# Patient Record
Sex: MALE | Race: WHITE | NOT HISPANIC OR LATINO | Employment: UNEMPLOYED | URBAN - METROPOLITAN AREA
[De-identification: names, ages, dates, MRNs, and addresses within clinical notes are randomized per-mention and may not be internally consistent; named-entity substitution may affect disease eponyms.]

---

## 2018-04-17 ENCOUNTER — HOSPITAL ENCOUNTER (EMERGENCY)
Facility: MEDICAL CENTER | Age: 33
End: 2018-04-17
Attending: EMERGENCY MEDICINE
Payer: MEDICAID

## 2018-04-17 VITALS
BODY MASS INDEX: 34.12 KG/M2 | WEIGHT: 230.38 LBS | TEMPERATURE: 97.9 F | HEART RATE: 98 BPM | DIASTOLIC BLOOD PRESSURE: 68 MMHG | OXYGEN SATURATION: 96 % | RESPIRATION RATE: 16 BRPM | SYSTOLIC BLOOD PRESSURE: 122 MMHG | HEIGHT: 69 IN

## 2018-04-17 DIAGNOSIS — K59.03 DRUG-INDUCED CONSTIPATION: ICD-10-CM

## 2018-04-17 PROCEDURE — 96372 THER/PROPH/DIAG INJ SC/IM: CPT

## 2018-04-17 PROCEDURE — 99284 EMERGENCY DEPT VISIT MOD MDM: CPT

## 2018-04-17 PROCEDURE — A9270 NON-COVERED ITEM OR SERVICE: HCPCS | Performed by: EMERGENCY MEDICINE

## 2018-04-17 PROCEDURE — 700102 HCHG RX REV CODE 250 W/ 637 OVERRIDE(OP): Performed by: EMERGENCY MEDICINE

## 2018-04-17 PROCEDURE — 700111 HCHG RX REV CODE 636 W/ 250 OVERRIDE (IP): Performed by: EMERGENCY MEDICINE

## 2018-04-17 RX ORDER — POLYETHYLENE GLYCOL 3350 17 G/17G
17 POWDER, FOR SOLUTION ORAL DAILY
Qty: 3 EACH | Refills: 0 | Status: SHIPPED | OUTPATIENT
Start: 2018-04-17 | End: 2018-04-20

## 2018-04-17 RX ORDER — DOCUSATE SODIUM 100 MG/1
100 CAPSULE, LIQUID FILLED ORAL 2 TIMES DAILY
Qty: 60 CAP | Refills: 0 | Status: SHIPPED | OUTPATIENT
Start: 2018-04-17

## 2018-04-17 RX ADMIN — METHYLNALTREXONE BROMIDE 12 MG: 12 INJECTION, SOLUTION SUBCUTANEOUS at 23:30

## 2018-04-17 RX ADMIN — MAGESIUM CITRATE 296 ML: 1.75 LIQUID ORAL at 23:29

## 2018-04-17 ASSESSMENT — LIFESTYLE VARIABLES: DO YOU DRINK ALCOHOL: NO

## 2018-04-18 NOTE — ED TRIAGE NOTES
Chief Complaint   Patient presents with   • Constipation     Pt ambulatory to triage, pt states a week and a half ago had a catheter placed due to unable to void (catheter in place and draining to gravity), and now he hasn't had a BM for the past 2 weeks, states he took milk of magnesium last night and the night before and still no BM. Pt states he had the kaufman placed at [facility].

## 2018-04-18 NOTE — ED TRIAGE NOTES
Chief Complaint   Patient presents with   • Constipation     Pt ambulatory to triage, pt states a week and a half ago had a catheter placed due to unable to void (catheter in place and draining to gravity), and now he hasn't had a BM for the past 2 weeks, states he took milk of magnesium last night and the night before and still no BM. Pt states he had the kaufman placed at [facility].

## 2018-04-18 NOTE — DISCHARGE INSTRUCTIONS
Medications like Suboxone that attach to opiate receptors are common causes of constipation and urinary retention. Use the MiraLAX for the next 3 days. After that, every time you take your Suboxone, take a stool softener. Please follow-up with the doctor prescribing your Suboxone regarding the urinary retention and constipation symptoms.     Constipation, Adult  Constipation is when a person:  · Poops (has a bowel movement) fewer times in a week than normal.  · Has a hard time pooping.  · Has poop that is dry, hard, or bigger than normal.  Follow these instructions at home:  Eating and drinking  · Eat foods that have a lot of fiber, such as:  ¨ Fresh fruits and vegetables.  ¨ Whole grains.  ¨ Beans.  · Eat less of foods that are high in fat, low in fiber, or overly processed, such as:  ¨ French fries.  ¨ Hamburgers.  ¨ Cookies.  ¨ Candy.  ¨ Soda.  · Drink enough fluid to keep your pee (urine) clear or pale yellow.  General instructions  · Exercise regularly or as told by your doctor.  · Go to the restroom when you feel like you need to poop. Do not hold it in.  · Take over-the-counter and prescription medicines only as told by your doctor. These include any fiber supplements.  · Do pelvic floor retraining exercises, such as:  ¨ Doing deep breathing while relaxing your lower belly (abdomen).  ¨ Relaxing your pelvic floor while pooping.  · Watch your condition for any changes.  · Keep all follow-up visits as told by your doctor. This is important.  Contact a doctor if:  · You have pain that gets worse.  · You have a fever.  · You have not pooped for 4 days.  · You throw up (vomit).  · You are not hungry.  · You lose weight.  · You are bleeding from the anus.  · You have thin, pencil-like poop (stool).  Get help right away if:  · You have a fever, and your symptoms suddenly get worse.  · You leak poop or have blood in your poop.  · Your belly feels hard or bigger than normal (is bloated).  · You have very bad belly  pain.  · You feel dizzy or you faint.  This information is not intended to replace advice given to you by your health care provider. Make sure you discuss any questions you have with your health care provider.  Document Released: 06/05/2009 Document Revised: 07/07/2017 Document Reviewed: 06/07/2017  Elsevier Interactive Patient Education © 2017 [employer]

## 2018-04-18 NOTE — ED PROVIDER NOTES
"ED Provider Note    Scribed for Scottie Littlejohn M.D. by Scottie Littlejohn. 4/17/2018  10:42 PM    CHIEF COMPLAINT  Chief Complaint   Patient presents with   • Constipation       HPI  Scottie Kelley is a 32 y.o. male who presents to the Emergency Room for constipation, with little or no bowel movements for the past 1.5 weeks. He also had a Galan catheter placed about the same time because of urinary retention. He has not had fevers or chills or vomiting. He has some nausea. He has some abdominal distention. The catheter was placed at [facility], and this is also where he is receiving Suboxone therapy. He reports diffuse intense discomfort from the abdominal distention. He has not had any abdominal trauma or bloody stool. He does not have any back pain or radiculopathy. He has had no significant response to milk of magnesia or over-the-counter medication from the [employer] that his girlfriend brought him. He is not exactly sure what that medication was.    REVIEW OF SYSTEMS  See HPI for further details.    PAST MEDICAL HISTORY   has a past medical history of Anxiety and Urinary catheter in place.    SOCIAL HISTORY  Social History     Social History Main Topics   • Smoking status: Current Every Day Smoker     Packs/day: 0.50     Types: Cigarettes   • Smokeless tobacco: Never Used      Comment: 1/2 ppd   • Alcohol use No   • Drug use: No   • Sexual activity: Not on file       SURGICAL HISTORY   has a past surgical history that includes other orthopedic surgery (2003) and other (december 2008).    CURRENT MEDICATIONS  Home Medications     Reviewed by Freda Mitchell R.N. (Registered Nurse) on 04/17/18 at [facility]  Med List Status: Complete   Medication Last Dose Status   Buprenorphine HCl-Naloxone HCl (SUBOXONE SL)  Active   mupirocin (BACTROBAN) 2 % OINT  Active   NON SPECIFIED  Active                ALLERGIES  Allergies   Allergen Reactions   • Ibuprofen Unspecified     \"stomach starts to bleed and get " "abdominal pain\"   • Pcn [Penicillins] Hives       PHYSICAL EXAM  VITAL SIGNS: /69   Pulse (!) 107   Temp 36.6 °C (97.9 °F)   Resp 16   Ht 1.753 m (5' 9\")   Wt 104.5 kg (230 lb 6.1 oz)   SpO2 96%   BMI 34.02 kg/m²   Pulse ox interpretation: I interpret this pulse ox as normal.  Constitutional: Alert in no apparent distress.  HENT: Normocephalic, Atraumatic, Bilateral external ears normal. Nose normal.   Eyes: Conjunctiva normal, non-icteric.   Heart: Normal peripheral perfusion  Lungs: Unlabored respirations  Abdomen: Some evidence of distention, firm, mild diffuse nonlocalizing tenderness.  Skin: Warm, Dry, No erythema, No rash.   Neurologic: Alert, Grossly non-focal.   Psychiatric: Affect normal, Judgment normal, Mood normal, Appears appropriate and not intoxicated.     No orders to display       COURSE & MEDICAL DECISION MAKING  The patient's VS, Nurses notes reviewed. (See chart for details)    10:42 PM Patient seen and examined at bedside. He reliably reports that he's had little or nothing in the way of bowel movements for about 1.5 weeks, and also had a Galan catheter placed at that time. This is shortly after starting Suboxone therapy. He says he was not warned about the potential side effects of urinary retention and constipation. He has had inadequate response to over-the-counter medications. We treated with Relistor here, and magnesium citrate, and discharged with prescriptions for 3 days of MiraLAX for the next 3 days, and then Colace, to be started after that, with each dose of Suboxone that he takes.    The patient will return for new or worsening symptoms and is stable at the time of discharge.    The patient is referred to a primary physician for blood pressure management, diabetic screening, and for all other preventative health concerns.      DISPOSITION:  Patient will be discharged home in stable condition.    FOLLOW UP:  Your prescribing doctor.             OUTPATIENT " MEDICATIONS:  New Prescriptions    DOCUSATE SODIUM (COLACE) 100 MG CAP    Take 1 Cap by mouth 2 times a day.    POLYETHYLENE GLYCOL/LYTES (MIRALAX) PACK    Take 1 Packet by mouth every day for 3 days.         FINAL IMPRESSION  1. Drug-induced constipation

## 2018-04-18 NOTE — ED NOTES
Patient discharged with instructions for constipation with prescriptions x2 signs and symptoms explained to patient for reasons to return to emergency department, Patient is understanding and has no further questions.

## 2021-11-26 ENCOUNTER — APPOINTMENT (OUTPATIENT)
Dept: RADIOLOGY | Facility: MEDICAL CENTER | Age: 36
End: 2021-11-26
Attending: EMERGENCY MEDICINE
Payer: MEDICAID

## 2021-11-26 ENCOUNTER — HOSPITAL ENCOUNTER (EMERGENCY)
Facility: MEDICAL CENTER | Age: 36
End: 2021-11-26
Attending: EMERGENCY MEDICINE
Payer: MEDICAID

## 2021-11-26 VITALS
OXYGEN SATURATION: 95 % | HEIGHT: 69 IN | TEMPERATURE: 97.2 F | HEART RATE: 105 BPM | BODY MASS INDEX: 29.62 KG/M2 | RESPIRATION RATE: 20 BRPM | DIASTOLIC BLOOD PRESSURE: 100 MMHG | SYSTOLIC BLOOD PRESSURE: 151 MMHG | WEIGHT: 200 LBS

## 2021-11-26 DIAGNOSIS — S41.012A LACERATION OF LEFT SHOULDER, INITIAL ENCOUNTER: ICD-10-CM

## 2021-11-26 DIAGNOSIS — S42.192A CLOSED FRACTURE OF OTHER PART OF LEFT SCAPULA, INITIAL ENCOUNTER: ICD-10-CM

## 2021-11-26 DIAGNOSIS — S41.012A STAB WOUND OF LEFT SHOULDER, INITIAL ENCOUNTER: ICD-10-CM

## 2021-11-26 PROBLEM — S21.112A STAB WOUND OF LEFT CHEST,: Status: ACTIVE | Noted: 2021-11-26

## 2021-11-26 PROBLEM — S42.115B: Status: ACTIVE | Noted: 2021-11-26

## 2021-11-26 LAB
ABO GROUP BLD: NORMAL
ALBUMIN SERPL BCP-MCNC: 4.2 G/DL (ref 3.2–4.9)
ALBUMIN/GLOB SERPL: 1.3 G/DL
ALP SERPL-CCNC: 98 U/L (ref 30–99)
ALT SERPL-CCNC: 29 U/L (ref 2–50)
ANION GAP SERPL CALC-SCNC: 11 MMOL/L (ref 7–16)
APTT PPP: 29.5 SEC (ref 24.7–36)
AST SERPL-CCNC: 25 U/L (ref 12–45)
BILIRUB SERPL-MCNC: 0.2 MG/DL (ref 0.1–1.5)
BLD GP AB SCN SERPL QL: NORMAL
BUN SERPL-MCNC: 13 MG/DL (ref 8–22)
CALCIUM SERPL-MCNC: 9.3 MG/DL (ref 8.5–10.5)
CHLORIDE SERPL-SCNC: 106 MMOL/L (ref 96–112)
CO2 SERPL-SCNC: 28 MMOL/L (ref 20–33)
CREAT SERPL-MCNC: 1.22 MG/DL (ref 0.5–1.4)
ERYTHROCYTE [DISTWIDTH] IN BLOOD BY AUTOMATED COUNT: 44.7 FL (ref 35.9–50)
ETHANOL BLD-MCNC: <10.1 MG/DL (ref 0–10)
GLOBULIN SER CALC-MCNC: 3.2 G/DL (ref 1.9–3.5)
GLUCOSE SERPL-MCNC: 87 MG/DL (ref 65–99)
HCT VFR BLD AUTO: 48.2 % (ref 42–52)
HGB BLD-MCNC: 15.2 G/DL (ref 14–18)
INR PPP: 0.93 (ref 0.87–1.13)
MCH RBC QN AUTO: 27.9 PG (ref 27–33)
MCHC RBC AUTO-ENTMCNC: 31.5 G/DL (ref 33.7–35.3)
MCV RBC AUTO: 88.4 FL (ref 81.4–97.8)
PLATELET # BLD AUTO: 362 K/UL (ref 164–446)
PMV BLD AUTO: 9.9 FL (ref 9–12.9)
POTASSIUM SERPL-SCNC: 3.7 MMOL/L (ref 3.6–5.5)
PROT SERPL-MCNC: 7.4 G/DL (ref 6–8.2)
PROTHROMBIN TIME: 12.2 SEC (ref 12–14.6)
RBC # BLD AUTO: 5.45 M/UL (ref 4.7–6.1)
RH BLD: NORMAL
SODIUM SERPL-SCNC: 145 MMOL/L (ref 135–145)
WBC # BLD AUTO: 12.2 K/UL (ref 4.8–10.8)

## 2021-11-26 PROCEDURE — 86900 BLOOD TYPING SEROLOGIC ABO: CPT

## 2021-11-26 PROCEDURE — 304217 HCHG IRRIGATION SYSTEM

## 2021-11-26 PROCEDURE — 80053 COMPREHEN METABOLIC PANEL: CPT

## 2021-11-26 PROCEDURE — 305308 HCHG STAPLER,SKIN,DISP.

## 2021-11-26 PROCEDURE — 99284 EMERGENCY DEPT VISIT MOD MDM: CPT

## 2021-11-26 PROCEDURE — 700117 HCHG RX CONTRAST REV CODE 255: Performed by: EMERGENCY MEDICINE

## 2021-11-26 PROCEDURE — 82077 ASSAY SPEC XCP UR&BREATH IA: CPT

## 2021-11-26 PROCEDURE — 304999 HCHG REPAIR-SIMPLE/INTERMED LEVEL 1

## 2021-11-26 PROCEDURE — 90471 IMMUNIZATION ADMIN: CPT

## 2021-11-26 PROCEDURE — A9270 NON-COVERED ITEM OR SERVICE: HCPCS | Performed by: EMERGENCY MEDICINE

## 2021-11-26 PROCEDURE — 700111 HCHG RX REV CODE 636 W/ 250 OVERRIDE (IP): Performed by: EMERGENCY MEDICINE

## 2021-11-26 PROCEDURE — 96365 THER/PROPH/DIAG IV INF INIT: CPT | Mod: XU

## 2021-11-26 PROCEDURE — 700101 HCHG RX REV CODE 250: Performed by: EMERGENCY MEDICINE

## 2021-11-26 PROCEDURE — 99291 CRITICAL CARE FIRST HOUR: CPT | Performed by: SURGERY

## 2021-11-26 PROCEDURE — 71045 X-RAY EXAM CHEST 1 VIEW: CPT

## 2021-11-26 PROCEDURE — 85730 THROMBOPLASTIN TIME PARTIAL: CPT

## 2021-11-26 PROCEDURE — 96375 TX/PRO/DX INJ NEW DRUG ADDON: CPT | Mod: XU

## 2021-11-26 PROCEDURE — 85027 COMPLETE CBC AUTOMATED: CPT

## 2021-11-26 PROCEDURE — 86901 BLOOD TYPING SEROLOGIC RH(D): CPT

## 2021-11-26 PROCEDURE — 307744 HCHG RED TRAUMA TEAM SERVICES

## 2021-11-26 PROCEDURE — 700101 HCHG RX REV CODE 250

## 2021-11-26 PROCEDURE — 85610 PROTHROMBIN TIME: CPT

## 2021-11-26 PROCEDURE — 86850 RBC ANTIBODY SCREEN: CPT

## 2021-11-26 PROCEDURE — 71260 CT THORAX DX C+: CPT

## 2021-11-26 PROCEDURE — 700102 HCHG RX REV CODE 250 W/ 637 OVERRIDE(OP): Performed by: EMERGENCY MEDICINE

## 2021-11-26 PROCEDURE — 90715 TDAP VACCINE 7 YRS/> IM: CPT | Performed by: EMERGENCY MEDICINE

## 2021-11-26 RX ORDER — CEPHALEXIN 500 MG/1
500 CAPSULE ORAL 4 TIMES DAILY
Qty: 28 CAPSULE | Refills: 0 | Status: SHIPPED | OUTPATIENT
Start: 2021-11-26 | End: 2021-12-03

## 2021-11-26 RX ORDER — HYDROCODONE BITARTRATE AND ACETAMINOPHEN 5; 325 MG/1; MG/1
1-2 TABLET ORAL EVERY 4 HOURS PRN
Qty: 10 TABLET | Refills: 0 | Status: SHIPPED | OUTPATIENT
Start: 2021-11-26 | End: 2021-11-29

## 2021-11-26 RX ORDER — LIDOCAINE HYDROCHLORIDE AND EPINEPHRINE 10; 10 MG/ML; UG/ML
INJECTION, SOLUTION INFILTRATION; PERINEURAL
Status: COMPLETED
Start: 2021-11-26 | End: 2021-11-26

## 2021-11-26 RX ORDER — HYDROCODONE BITARTRATE AND ACETAMINOPHEN 5; 325 MG/1; MG/1
1 TABLET ORAL ONCE
Status: COMPLETED | OUTPATIENT
Start: 2021-11-26 | End: 2021-11-26

## 2021-11-26 RX ORDER — IBUPROFEN 600 MG/1
600 TABLET ORAL ONCE
Status: COMPLETED | OUTPATIENT
Start: 2021-11-26 | End: 2021-11-26

## 2021-11-26 RX ORDER — CEFAZOLIN SODIUM 2 G/100ML
INJECTION, SOLUTION INTRAVENOUS
Status: COMPLETED | OUTPATIENT
Start: 2021-11-26 | End: 2021-11-26

## 2021-11-26 RX ORDER — LIDOCAINE HYDROCHLORIDE AND EPINEPHRINE 10; 10 MG/ML; UG/ML
20 INJECTION, SOLUTION INFILTRATION; PERINEURAL ONCE
Status: COMPLETED | OUTPATIENT
Start: 2021-11-26 | End: 2021-11-26

## 2021-11-26 RX ORDER — IBUPROFEN 600 MG/1
600 TABLET ORAL EVERY 6 HOURS PRN
Qty: 20 TABLET | Refills: 0 | Status: SHIPPED | OUTPATIENT
Start: 2021-11-26

## 2021-11-26 RX ORDER — LIDOCAINE HYDROCHLORIDE AND EPINEPHRINE BITARTRATE 20; .01 MG/ML; MG/ML
10 INJECTION, SOLUTION SUBCUTANEOUS ONCE
Status: DISCONTINUED | OUTPATIENT
Start: 2021-11-26 | End: 2021-11-26

## 2021-11-26 RX ADMIN — CEFAZOLIN SODIUM 2 G: 2 INJECTION, SOLUTION INTRAVENOUS at 04:30

## 2021-11-26 RX ADMIN — IOHEXOL 80 ML: 350 INJECTION, SOLUTION INTRAVENOUS at 04:27

## 2021-11-26 RX ADMIN — CLOSTRIDIUM TETANI TOXOID ANTIGEN (FORMALDEHYDE INACTIVATED), CORYNEBACTERIUM DIPHTHERIAE TOXOID ANTIGEN (FORMALDEHYDE INACTIVATED), BORDETELLA PERTUSSIS TOXOID ANTIGEN (GLUTARALDEHYDE INACTIVATED), BORDETELLA PERTUSSIS FILAMENTOUS HEMAGGLUTININ ANTIGEN (FORMALDEHYDE INACTIVATED), BORDETELLA PERTUSSIS PERTACTIN ANTIGEN, AND BORDETELLA PERTUSSIS FIMBRIAE 2/3 ANTIGEN 0.5 ML: 5; 2; 2.5; 5; 3; 5 INJECTION, SUSPENSION INTRAMUSCULAR at 04:17

## 2021-11-26 RX ADMIN — LIDOCAINE HYDROCHLORIDE,EPINEPHRINE BITARTRATE 20 ML: 10; .01 INJECTION, SOLUTION INFILTRATION; PERINEURAL at 05:25

## 2021-11-26 RX ADMIN — LIDOCAINE HYDROCHLORIDE,EPINEPHRINE BITARTRATE: 10; .01 INJECTION, SOLUTION INFILTRATION; PERINEURAL at 05:15

## 2021-11-26 RX ADMIN — HYDROCODONE BITARTRATE AND ACETAMINOPHEN 1 TABLET: 5; 325 TABLET ORAL at 05:23

## 2021-11-26 RX ADMIN — IBUPROFEN 600 MG: 600 TABLET, FILM COATED ORAL at 05:23

## 2021-11-26 RX ADMIN — FENTANYL CITRATE 50 MCG: 50 INJECTION, SOLUTION INTRAMUSCULAR; INTRAVENOUS at 04:13

## 2021-11-26 ASSESSMENT — LIFESTYLE VARIABLES
DOES PATIENT WANT TO STOP DRINKING: NO
DO YOU DRINK ALCOHOL: NO
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
## 2021-11-26 NOTE — DISCHARGE INSTRUCTIONS
Follow-up with orthopedics next week for reevaluation and definitive treatment if indicated.  Call Dr. Dowling's office on Monday morning, references emergency department visit and schedule an appointment for follow-up.  Staple removal required in 10 days.  This can be done at orthopedic office, otherwise return to the emergency department.    Motrin 600 mg every 6 hours as needed for discomfort.  Norco every 4-6 hours as needed for breakthrough pain.  Keflex 4 times daily for 7 days.    Nonweightbearing left upper extremity.  Use sling for immobility, comfort and support.    Keep wound clean, dry and intact.  Cleanse gently with warm water, soap, pat dry twice daily.  Avoid soaking wound in water.  Apply antibiotic ointment twice daily.    Return the emergency department for increased pain, swelling, redness, paresthesias, fever, chest pain, shortness of breath or other new concerns.

## 2021-11-26 NOTE — ASSESSMENT & PLAN NOTE
CT shows:  Subcutaneous gas in the left subscapular and posterior deltoid muscles with a nondisplaced left scapular wing fracture.  Non-operative management.   Sling for comfort   Follow up in office - 1 week  Weight bearing status - Weightbearing as tolerated WES Griffin MD. Orthopedic Surgeon. [facility].
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
## 2021-11-26 NOTE — ED NOTES
"Pt discharged In custody of [employer], pt ambulatory with steady gait. AOx4. IV discontinued and gauze placed, pt in possession of belongings. Pt provided discharge education and information pertaining to medications and follow up appointments. Pt received copy of discharge instructions and verbalized understanding.     BP (!) 151/100   Pulse 105   Temp 36.2 °C (97.2 °F) (Temporal)   Resp 20   Ht 1.753 m (5' 9\")   Wt 90.7 kg (200 lb)   SpO2 95%   BMI 29.53 kg/m²   "
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
## 2022-10-01 ENCOUNTER — HOSPITAL ENCOUNTER (EMERGENCY)
Facility: MEDICAL CENTER | Age: 37
End: 2022-10-02
Attending: EMERGENCY MEDICINE
Payer: MEDICAID

## 2022-10-01 DIAGNOSIS — F15.10 AMPHETAMINE ABUSE (HCC): ICD-10-CM

## 2022-10-01 DIAGNOSIS — F11.23 OPIOID DEPENDENCE WITH WITHDRAWAL (HCC): ICD-10-CM

## 2022-10-01 PROCEDURE — 99284 EMERGENCY DEPT VISIT MOD MDM: CPT

## 2022-10-01 PROCEDURE — 700111 HCHG RX REV CODE 636 W/ 250 OVERRIDE (IP): Performed by: EMERGENCY MEDICINE

## 2022-10-01 RX ORDER — NALOXONE HYDROCHLORIDE 4 MG/.1ML
SPRAY NASAL
Qty: 2 EACH | Refills: 0 | Status: SHIPPED | OUTPATIENT
Start: 2022-10-01 | End: 2022-10-12

## 2022-10-01 RX ORDER — BUPRENORPHINE HYDROCHLORIDE AND NALOXONE HYDROCHLORIDE DIHYDRATE 8; 2 MG/1; MG/1
2 TABLET SUBLINGUAL DAILY
Qty: 6 TABLET | Refills: 0 | Status: SHIPPED | OUTPATIENT
Start: 2022-10-01 | End: 2022-10-05

## 2022-10-01 RX ORDER — BUPRENORPHINE HYDROCHLORIDE AND NALOXONE HYDROCHLORIDE DIHYDRATE 8; 2 MG/1; MG/1
1 TABLET SUBLINGUAL ONCE
Status: COMPLETED | OUTPATIENT
Start: 2022-10-01 | End: 2022-10-01

## 2022-10-01 RX ADMIN — BUPRENORPHINE HYDROCHLORIDE AND NALOXONE HYDROCHLORIDE DIHYDRATE 1 TABLET: 8; 2 TABLET SUBLINGUAL at 23:59

## 2022-10-01 ASSESSMENT — FIBROSIS 4 INDEX: FIB4 SCORE: 0.45

## 2022-10-02 ENCOUNTER — PHARMACY VISIT (OUTPATIENT)
Dept: PHARMACY | Facility: MEDICAL CENTER | Age: 37
End: 2022-10-02
Payer: COMMERCIAL

## 2022-10-02 VITALS
DIASTOLIC BLOOD PRESSURE: 87 MMHG | SYSTOLIC BLOOD PRESSURE: 139 MMHG | TEMPERATURE: 97 F | OXYGEN SATURATION: 95 % | HEART RATE: 105 BPM | RESPIRATION RATE: 16 BRPM | WEIGHT: 192.68 LBS | HEIGHT: 69 IN | BODY MASS INDEX: 28.54 KG/M2

## 2022-10-02 PROCEDURE — RXMED WILLOW AMBULATORY MEDICATION CHARGE: Performed by: EMERGENCY MEDICINE

## 2022-10-02 NOTE — ED PROVIDER NOTES
"ED Provider Note    CHIEF COMPLAINT  Chief Complaint   Patient presents with    Detox     PT reports use of fentanyl PT asking for assistance with  detox. PT reports last use yesterday.  PT reports use of Meth to deal with s/s of detox today around 12pm.       HPI  Scottie Kelley is a 37 y.o. male who presents to the emergency department chief complaint of withdrawal.  The patient has been using fentanyl last used earlier today and then used methamphetamines to help with withdrawal-like symptoms.  He is here to get help with his withdrawals.  He states he feels pretty antsy but otherwise no nausea no vomiting fevers no chills.  He feels a little depressed but not suicidal.  He is willing to take buprenorphine and is wishing to get help with detox.    REVIEW OF SYSTEMS  Positives as above. Pertinent negatives include suicidal or homicidal ideation nausea vomiting fevers chills  All other review of systems are negative    PAST MEDICAL HISTORY   has a past medical history of Anxiety and Urinary catheter in place.    SOCIAL HISTORY  Social History     Tobacco Use    Smoking status: Every Day     Packs/day: 0.50     Types: Cigarettes    Smokeless tobacco: Never    Tobacco comments:     1/2 ppd   Substance and Sexual Activity    Alcohol use: No    Drug use: Yes     Types: Inhaled     Comment: meth,Marijuana, fent    Sexual activity: Not on file       SURGICAL HISTORY   has a past surgical history that includes other orthopedic surgery (2003) and other (december 2008).    CURRENT MEDICATIONS  Home Medications    **Home medications have not yet been reviewed for this encounter**         ALLERGIES  Allergies   Allergen Reactions    Ibuprofen Unspecified     \"stomach starts to bleed and get abdominal pain\"    Pcn [Penicillins] Hives       PHYSICAL EXAM  VITAL SIGNS: BP (!) 147/96   Pulse (!) 124   Temp 36.2 °C (97.1 °F) (Temporal)   Resp 18   Ht 1.753 m (5' 9\")   Wt 87.4 kg (192 lb 10.9 oz)   SpO2 98%   BMI 28.45 " "kg/m²    Pulse ox interpretation: I interpret this pulse ox as normal.  Constitutional: Alert in no apparent distress.  HENT: Normocephalic atraumatic, MMM  Eyes: PER, Conjunctiva normal, Non-icteric.   Neck: Normal range of motion, No tenderness, Supple, No stridor.   Cardiovascular: Regular rhythm tachycardic no murmurs.   Thorax & Lungs: Normal breath sounds, No respiratory distress, No wheezing, No chest tenderness.   Abdomen: Bowel sounds normal, Soft, No tenderness, No pulsatile masses. No peritoneal signs.  Skin: Warm, Dry, No erythema, No rash.   Back: No bony tenderness, No CVA tenderness.   Extremities/MSK: Intact equal distal pulses, No edema, No tenderness, No cyanosis, no major deformities noted  Neurologic: Alert and oriented x3, No focal deficits noted.   Psychiatric: Anxious      DIFFERENTIAL DIAGNOSIS AND WORK UP PLAN    This is a 37 y.o. male who presents with history of opioid abuse and likely in a little bit of withdrawal at this time, I am going to give him a single dose of Suboxone here in the emergency department and will also prescribe him some intranasal naloxone as needed.  We did have our peers support team evaluate the patient for possible inpatient rehab.    Patient tolerated the Suboxone quite well.  He was given a prescription or written for it for the next 2 days.  He was also given the option to follow-up with community triage center this evening to help with all of their substance abuse issues.  The patient feels comfortable being discharged    /87   Pulse (!) 105   Temp 36.1 °C (97 °F) (Temporal)   Resp 16   Ht 1.753 m (5' 9\")   Wt 87.4 kg (192 lb 10.9 oz)   SpO2 95%   BMI 28.45 kg/m²       I verified that the patient was wearing a mask and I was wearing appropriate PPE every time I entered the room. The patient's mask was on the patient at all times during my encounter except for a brief view of the oropharynx.    The patient will return for new or worsening symptoms " and is stable at the time of discharge.    The patient is referred to a primary physician for blood pressure management, diabetic screening, and for all other preventative health concerns.    DISPOSITION:  Patient will be discharged home in stable condition.    FOLLOW UP:  [facility], Emergency Dept  [address]  [place] [id number] [phone]    If symptoms worsen      OUTPATIENT MEDICATIONS:  Discharge Medication List as of 10/2/2022 12:05 AM        START taking these medications    Details   Naloxone (NARCAN) 4 MG/0.1ML Liquid Administer a single spray of NARCAN Nasal [place] 4 mg/0.1 mL intranasally into one nostril, call 911 for emergency medical assistance., Disp-2 Each, R-0, Normal      buprenorphine-naloxone (SUBOXONE) 8-2 mg SL Tab Place 2 Tablets under the tongue every day for 3 days.For meds to beds in ER.Disp-6 Tablet, R-0, Normal                 FINAL IMPRESSION  1. Opioid dependence with withdrawal (HCC)  Naloxone (NARCAN) 4 MG/0.1ML Liquid    buprenorphine-naloxone (SUBOXONE) 8-2 mg SL Tab      2. Amphetamine abuse (HCC)                Electronically signed by: Tasha Mandujano M.D., 10/1/2022 10:32 PM    This dictation has been created using voice recognition software and/or scribes. The accuracy of the dictation is limited by the abilities of the software and the expertise of the scribes. I expect there may be some errors of grammar and possibly content. I made every attempt to manually correct the errors within my dictation. However, errors related to voice recognition software and/or scribes may still exist and should be interpreted within the appropriate context.

## 2022-10-02 NOTE — ED TRIAGE NOTES
Chief Complaint   Patient presents with    Detox     PT reports use of fentanyl PT asking for assistance with  detox. PT reports last use yesterday.  PT reports use of Meth to deal with s/s of detox today around 12pm.

## 2022-10-02 NOTE — PROGRESS NOTES
Naloxone 4 mg/0.1 mL nasal spray (2 pack) was dispensed to the patient for prevention of potential opioid related overdose per protocol.     Counseling was provided regarding:  - Information relating to the recognition, prevention and responses to opioid-related drug overdoses  - How to safely administer the naloxone nasal spray  - Potential side effects and adverse events related to the naloxone nasal spray  - The importance of seeking immediate emergency medical assistance for a person experiencing an opioid-related drug overdose, even after the administration of naloxone  - The immunity from certain civil or criminal liabilities for seeking medical assistance for a person experiencing an opioid-related overdose    Naloxone was given to the patient.     Simon Loco, PharmD

## 2022-10-02 NOTE — ED NOTES
Pt reports he would like suboxone outpatient treatment or to go to a facility with his fiance.   Reports meth use intermittently over a few years, last used today around noon  Fentanyl x 6 months, last used yesterday.

## 2022-10-02 NOTE — ED NOTES
Pt provided d/c instructions and verbalizes understanding with no further questions. Rx sent to pt's requested pharmacy. Home care instructions explained. Pt ambulatory to ED renetta